# Patient Record
Sex: FEMALE | Race: BLACK OR AFRICAN AMERICAN | NOT HISPANIC OR LATINO | URBAN - METROPOLITAN AREA
[De-identification: names, ages, dates, MRNs, and addresses within clinical notes are randomized per-mention and may not be internally consistent; named-entity substitution may affect disease eponyms.]

---

## 2020-06-16 ENCOUNTER — OFFICE VISIT (OUTPATIENT)
Dept: URBAN - METROPOLITAN AREA CLINIC 12 | Facility: CLINIC | Age: 50
End: 2020-06-16
Payer: MEDICAID

## 2020-06-16 ENCOUNTER — DASHBOARD ENCOUNTERS (OUTPATIENT)
Age: 50
End: 2020-06-16

## 2020-06-16 VITALS
WEIGHT: 287 LBS | DIASTOLIC BLOOD PRESSURE: 95 MMHG | TEMPERATURE: 97.1 F | SYSTOLIC BLOOD PRESSURE: 150 MMHG | HEIGHT: 63 IN | BODY MASS INDEX: 50.85 KG/M2 | HEART RATE: 73 BPM

## 2020-06-16 DIAGNOSIS — Z12.11 COLON CANCER SCREENING: ICD-10-CM

## 2020-06-16 PROCEDURE — 99201 OFFICE OR OTHER OUTPATIENT VISIT FOR THE EVALUATION AND MANAGEMENT OF A NEW PATIENT, WHICH REQUIRES THESE 3 KEY COMPONENTS: A PROBLEM FOCUSED HISTOR: CPT | Performed by: INTERNAL MEDICINE

## 2020-06-16 PROCEDURE — G9903 PT SCRN TBCO ID AS NON USER: HCPCS | Performed by: INTERNAL MEDICINE

## 2020-06-16 PROCEDURE — G8417 CALC BMI ABV UP PARAM F/U: HCPCS | Performed by: INTERNAL MEDICINE

## 2020-06-16 PROCEDURE — 3017F COLORECTAL CA SCREEN DOC REV: CPT | Performed by: INTERNAL MEDICINE

## 2020-06-16 RX ORDER — ATORVASTATIN CALCIUM 10 MG/1
1 TABLET TABLET, FILM COATED ORAL ONCE A DAY
Status: ACTIVE | COMMUNITY

## 2020-06-16 RX ORDER — FLUOXETINE HYDROCHLORIDE 20 MG/1
1 CAPSULE CAPSULE ORAL ONCE A DAY
Status: ACTIVE | COMMUNITY

## 2020-06-16 RX ORDER — AMLODIPINE BESYLATE 10 MG/1
1 TABLET TABLET ORAL ONCE A DAY
Status: ACTIVE | COMMUNITY

## 2020-06-16 RX ORDER — VALSARTAN 320 MG/1
1 TABLET TABLET, FILM COATED ORAL ONCE A DAY
Status: ACTIVE | COMMUNITY

## 2020-06-16 NOTE — HPI-OTHER HISTORIES
50F morbid obesity  here for screening colon never had colon before BM everyday. on and off, small amt of BRB on wiping--3 times a year for many years no change in stool caliber no GI complaints feels fine  no FH CA colon no abd surgeries

## 2020-06-16 NOTE — EXAM-PHYSICAL EXAM
General--no acute distress Eyes--anicteric HENT--normocephalic, atraumatic head Neck--no lymphadenopathy Chest--normal breath sounds Heart--regular rate and rhythm Abdomen--soft, non tender, non distended, bowel sounds present

## 2020-06-18 ENCOUNTER — OFFICE VISIT (OUTPATIENT)
Dept: URBAN - METROPOLITAN AREA CLINIC 12 | Facility: CLINIC | Age: 50
End: 2020-06-18

## 2020-10-05 ENCOUNTER — TELEPHONE ENCOUNTER (OUTPATIENT)
Dept: URBAN - METROPOLITAN AREA CLINIC 12 | Facility: CLINIC | Age: 50
End: 2020-10-05

## 2020-10-16 ENCOUNTER — TELEPHONE ENCOUNTER (OUTPATIENT)
Dept: URBAN - METROPOLITAN AREA CLINIC 23 | Facility: CLINIC | Age: 50
End: 2020-10-16

## 2020-10-23 ENCOUNTER — OFFICE VISIT (OUTPATIENT)
Dept: URBAN - METROPOLITAN AREA MEDICAL CENTER 28 | Facility: MEDICAL CENTER | Age: 50
End: 2020-10-23
Payer: MEDICAID

## 2020-10-23 DIAGNOSIS — Z12.11 COLON CANCER SCREENING: ICD-10-CM

## 2020-10-23 DIAGNOSIS — D12.3 ADENOMA OF TRANSVERSE COLON: ICD-10-CM

## 2020-10-23 PROCEDURE — 45380 COLONOSCOPY AND BIOPSY: CPT | Performed by: INTERNAL MEDICINE

## 2020-10-23 PROCEDURE — G9933 CANC DETECTD DURING COL SCRN: HCPCS | Performed by: INTERNAL MEDICINE

## 2020-10-23 RX ORDER — VALSARTAN 320 MG/1
1 TABLET TABLET, FILM COATED ORAL ONCE A DAY
Status: ACTIVE | COMMUNITY

## 2020-10-23 RX ORDER — FLUOXETINE HYDROCHLORIDE 20 MG/1
1 CAPSULE CAPSULE ORAL ONCE A DAY
Status: ACTIVE | COMMUNITY

## 2020-10-23 RX ORDER — ATORVASTATIN CALCIUM 10 MG/1
1 TABLET TABLET, FILM COATED ORAL ONCE A DAY
Status: ACTIVE | COMMUNITY

## 2020-10-23 RX ORDER — AMLODIPINE BESYLATE 10 MG/1
1 TABLET TABLET ORAL ONCE A DAY
Status: ACTIVE | COMMUNITY